# Patient Record
Sex: FEMALE | Race: BLACK OR AFRICAN AMERICAN | Employment: UNEMPLOYED | URBAN - METROPOLITAN AREA
[De-identification: names, ages, dates, MRNs, and addresses within clinical notes are randomized per-mention and may not be internally consistent; named-entity substitution may affect disease eponyms.]

---

## 2017-08-08 ENCOUNTER — OFFICE VISIT - HEALTHEAST (OUTPATIENT)
Dept: FAMILY MEDICINE | Facility: CLINIC | Age: 52
End: 2017-08-08

## 2017-08-08 DIAGNOSIS — H61.20 CERUMEN IMPACTION: ICD-10-CM

## 2017-08-08 DIAGNOSIS — R39.15 URINARY URGENCY: ICD-10-CM

## 2017-08-08 DIAGNOSIS — Z00.00 HEALTH CARE MAINTENANCE: ICD-10-CM

## 2017-08-08 LAB
CHOLEST SERPL-MCNC: 208 MG/DL
FASTING STATUS PATIENT QL REPORTED: YES
HDLC SERPL-MCNC: 59 MG/DL
LDLC SERPL CALC-MCNC: 128 MG/DL
TRIGL SERPL-MCNC: 105 MG/DL

## 2017-08-08 ASSESSMENT — MIFFLIN-ST. JEOR: SCORE: 1322.08

## 2017-08-11 ENCOUNTER — AMBULATORY - HEALTHEAST (OUTPATIENT)
Dept: FAMILY MEDICINE | Facility: CLINIC | Age: 52
End: 2017-08-11

## 2017-08-11 ENCOUNTER — COMMUNICATION - HEALTHEAST (OUTPATIENT)
Dept: FAMILY MEDICINE | Facility: CLINIC | Age: 52
End: 2017-08-11

## 2017-08-11 DIAGNOSIS — N39.0 UTI (URINARY TRACT INFECTION): ICD-10-CM

## 2017-08-14 ENCOUNTER — HOSPITAL ENCOUNTER (OUTPATIENT)
Dept: MAMMOGRAPHY | Facility: CLINIC | Age: 52
Discharge: HOME | End: 2017-08-14

## 2017-08-14 DIAGNOSIS — Z00.00 HEALTH CARE MAINTENANCE: ICD-10-CM

## 2017-08-15 ENCOUNTER — COMMUNICATION - HEALTHEAST (OUTPATIENT)
Dept: MAMMOGRAPHY | Facility: CLINIC | Age: 52
End: 2017-08-15

## 2017-08-16 ENCOUNTER — HOSPITAL ENCOUNTER (OUTPATIENT)
Dept: MAMMOGRAPHY | Facility: CLINIC | Age: 52
Discharge: HOME | End: 2017-08-16

## 2017-08-16 ENCOUNTER — HOSPITAL ENCOUNTER (OUTPATIENT)
Dept: ULTRASOUND IMAGING | Facility: CLINIC | Age: 52
Discharge: HOME | End: 2017-08-16

## 2017-08-16 DIAGNOSIS — N64.9 BREAST LESION: ICD-10-CM

## 2017-08-16 DIAGNOSIS — N64.89 BREAST ASYMMETRY: ICD-10-CM

## 2018-03-20 ENCOUNTER — OFFICE VISIT - HEALTHEAST (OUTPATIENT)
Dept: FAMILY MEDICINE | Facility: CLINIC | Age: 53
End: 2018-03-20

## 2018-03-20 DIAGNOSIS — R05.9 COUGH: ICD-10-CM

## 2018-10-09 ENCOUNTER — OFFICE VISIT - HEALTHEAST (OUTPATIENT)
Dept: FAMILY MEDICINE | Facility: CLINIC | Age: 53
End: 2018-10-09

## 2018-10-09 DIAGNOSIS — N39.0 URINARY TRACT INFECTION: ICD-10-CM

## 2018-10-09 DIAGNOSIS — R30.0 DYSURIA: ICD-10-CM

## 2018-10-09 LAB
ALBUMIN UR-MCNC: NEGATIVE MG/DL
APPEARANCE UR: CLEAR
BACTERIA #/AREA URNS HPF: ABNORMAL HPF
BILIRUB UR QL STRIP: NEGATIVE
COLOR UR AUTO: YELLOW
GLUCOSE UR STRIP-MCNC: NEGATIVE MG/DL
HGB UR QL STRIP: ABNORMAL
KETONES UR STRIP-MCNC: NEGATIVE MG/DL
LEUKOCYTE ESTERASE UR QL STRIP: ABNORMAL
NITRATE UR QL: NEGATIVE
PH UR STRIP: 6 [PH] (ref 5–8)
RBC #/AREA URNS AUTO: ABNORMAL HPF
SP GR UR STRIP: <=1.005 (ref 1–1.03)
SQUAMOUS #/AREA URNS AUTO: ABNORMAL LPF
UROBILINOGEN UR STRIP-ACNC: ABNORMAL
WBC #/AREA URNS AUTO: ABNORMAL HPF

## 2018-10-10 ENCOUNTER — COMMUNICATION - HEALTHEAST (OUTPATIENT)
Dept: FAMILY MEDICINE | Facility: CLINIC | Age: 53
End: 2018-10-10

## 2018-10-10 DIAGNOSIS — N39.0 URINARY TRACT INFECTION: ICD-10-CM

## 2018-10-11 ENCOUNTER — COMMUNICATION - HEALTHEAST (OUTPATIENT)
Dept: FAMILY MEDICINE | Facility: CLINIC | Age: 53
End: 2018-10-11

## 2018-10-11 ENCOUNTER — AMBULATORY - HEALTHEAST (OUTPATIENT)
Dept: FAMILY MEDICINE | Facility: CLINIC | Age: 53
End: 2018-10-11

## 2018-10-11 DIAGNOSIS — N39.0 URINARY TRACT INFECTION: ICD-10-CM

## 2018-10-11 LAB — BACTERIA SPEC CULT: ABNORMAL

## 2018-12-12 ENCOUNTER — OFFICE VISIT - HEALTHEAST (OUTPATIENT)
Dept: FAMILY MEDICINE | Facility: CLINIC | Age: 53
End: 2018-12-12

## 2018-12-12 DIAGNOSIS — Z00.00 ROUTINE GENERAL MEDICAL EXAMINATION AT A HEALTH CARE FACILITY: ICD-10-CM

## 2018-12-12 DIAGNOSIS — Z12.31 VISIT FOR SCREENING MAMMOGRAM: ICD-10-CM

## 2018-12-12 DIAGNOSIS — Z12.11 SCREEN FOR COLON CANCER: ICD-10-CM

## 2018-12-12 DIAGNOSIS — N39.0 URINARY TRACT INFECTION WITHOUT HEMATURIA, SITE UNSPECIFIED: ICD-10-CM

## 2018-12-12 LAB
CHOLEST SERPL-MCNC: 186 MG/DL
FASTING STATUS PATIENT QL REPORTED: YES
FASTING STATUS PATIENT QL REPORTED: YES
GLUCOSE BLD-MCNC: 78 MG/DL (ref 70–99)
HDLC SERPL-MCNC: 65 MG/DL
LDLC SERPL CALC-MCNC: 107 MG/DL
TRIGL SERPL-MCNC: 70 MG/DL

## 2018-12-12 ASSESSMENT — MIFFLIN-ST. JEOR: SCORE: 1352.93

## 2018-12-13 ENCOUNTER — COMMUNICATION - HEALTHEAST (OUTPATIENT)
Dept: FAMILY MEDICINE | Facility: CLINIC | Age: 53
End: 2018-12-13

## 2018-12-13 LAB — BACTERIA SPEC CULT: NO GROWTH

## 2019-01-25 ENCOUNTER — COMMUNICATION - HEALTHEAST (OUTPATIENT)
Dept: ADMINISTRATIVE | Facility: CLINIC | Age: 54
End: 2019-01-25

## 2019-04-11 ENCOUNTER — COMMUNICATION - HEALTHEAST (OUTPATIENT)
Dept: ADMINISTRATIVE | Facility: CLINIC | Age: 54
End: 2019-04-11

## 2019-10-28 ENCOUNTER — COMMUNICATION - HEALTHEAST (OUTPATIENT)
Dept: ADMINISTRATIVE | Facility: CLINIC | Age: 54
End: 2019-10-28

## 2019-10-28 DIAGNOSIS — Z12.11 SPECIAL SCREENING FOR MALIGNANT NEOPLASMS, COLON: ICD-10-CM

## 2019-10-31 ENCOUNTER — RECORDS - HEALTHEAST (OUTPATIENT)
Dept: ADMINISTRATIVE | Facility: OTHER | Age: 54
End: 2019-10-31

## 2020-03-10 ENCOUNTER — OFFICE VISIT - HEALTHEAST (OUTPATIENT)
Dept: FAMILY MEDICINE | Facility: CLINIC | Age: 55
End: 2020-03-10

## 2020-03-10 DIAGNOSIS — Z00.00 ROUTINE GENERAL MEDICAL EXAMINATION AT A HEALTH CARE FACILITY: ICD-10-CM

## 2020-03-10 DIAGNOSIS — R35.0 URINARY FREQUENCY: ICD-10-CM

## 2020-03-10 LAB
ALBUMIN UR-MCNC: ABNORMAL MG/DL
APPEARANCE UR: CLEAR
BACTERIA #/AREA URNS HPF: ABNORMAL HPF
BILIRUB UR QL STRIP: NEGATIVE
CHOLEST SERPL-MCNC: 197 MG/DL
COLOR UR AUTO: YELLOW
FASTING STATUS PATIENT QL REPORTED: YES
FASTING STATUS PATIENT QL REPORTED: YES
GLUCOSE BLD-MCNC: 90 MG/DL (ref 70–99)
GLUCOSE UR STRIP-MCNC: NEGATIVE MG/DL
HDLC SERPL-MCNC: 71 MG/DL
HGB UR QL STRIP: ABNORMAL
KETONES UR STRIP-MCNC: NEGATIVE MG/DL
LDLC SERPL CALC-MCNC: 107 MG/DL
LEUKOCYTE ESTERASE UR QL STRIP: ABNORMAL
NITRATE UR QL: NEGATIVE
PH UR STRIP: 6 [PH] (ref 5–8)
RBC #/AREA URNS AUTO: ABNORMAL HPF
SP GR UR STRIP: 1.02 (ref 1–1.03)
SQUAMOUS #/AREA URNS AUTO: ABNORMAL LPF
TRIGL SERPL-MCNC: 93 MG/DL
UROBILINOGEN UR STRIP-ACNC: ABNORMAL
WBC #/AREA URNS AUTO: ABNORMAL HPF

## 2020-03-10 ASSESSMENT — MIFFLIN-ST. JEOR: SCORE: 1367.09

## 2020-03-11 LAB
BACTERIA SPEC CULT: NO GROWTH
HPV SOURCE: NORMAL
HUMAN PAPILLOMA VIRUS 16 DNA: NEGATIVE
HUMAN PAPILLOMA VIRUS 18 DNA: NEGATIVE
HUMAN PAPILLOMA VIRUS FINAL DIAGNOSIS: NORMAL
HUMAN PAPILLOMA VIRUS OTHER HR: NEGATIVE
SPECIMEN DESCRIPTION: NORMAL

## 2020-03-16 ENCOUNTER — HOSPITAL ENCOUNTER (OUTPATIENT)
Dept: MAMMOGRAPHY | Facility: CLINIC | Age: 55
Discharge: HOME | End: 2020-03-16
Attending: FAMILY MEDICINE

## 2020-03-16 DIAGNOSIS — Z00.00 ROUTINE GENERAL MEDICAL EXAMINATION AT A HEALTH CARE FACILITY: ICD-10-CM

## 2020-03-18 LAB
BKR LAB AP ABNORMAL BLEEDING: NO
BKR LAB AP BIRTH CONTROL/HORMONES: NORMAL
BKR LAB AP CERVICAL APPEARANCE: NORMAL
BKR LAB AP GYN ADEQUACY: NORMAL
BKR LAB AP GYN INTERPRETATION: NORMAL
BKR LAB AP HPV REFLEX: NORMAL
BKR LAB AP LMP: NORMAL
BKR LAB AP PATIENT STATUS: NORMAL
BKR LAB AP PREVIOUS ABNORMAL: NORMAL
BKR LAB AP PREVIOUS NORMAL: 2014
HIGH RISK?: NO
PATH REPORT.COMMENTS IMP SPEC: NORMAL
RESULT FLAG (HE HISTORICAL CONVERSION): NORMAL

## 2020-03-19 ENCOUNTER — COMMUNICATION - HEALTHEAST (OUTPATIENT)
Dept: FAMILY MEDICINE | Facility: CLINIC | Age: 55
End: 2020-03-19

## 2020-03-30 ENCOUNTER — RECORDS - HEALTHEAST (OUTPATIENT)
Dept: ADMINISTRATIVE | Facility: OTHER | Age: 55
End: 2020-03-30

## 2020-03-30 LAB — COLOGUARD-ABSTRACT: NEGATIVE

## 2020-04-08 ENCOUNTER — RECORDS - HEALTHEAST (OUTPATIENT)
Dept: HEALTH INFORMATION MANAGEMENT | Facility: CLINIC | Age: 55
End: 2020-04-08

## 2021-05-31 ENCOUNTER — RECORDS - HEALTHEAST (OUTPATIENT)
Dept: ADMINISTRATIVE | Facility: CLINIC | Age: 56
End: 2021-05-31

## 2021-05-31 VITALS — BODY MASS INDEX: 33.08 KG/M2 | WEIGHT: 175.2 LBS | HEIGHT: 61 IN

## 2021-06-01 VITALS — WEIGHT: 181 LBS | BODY MASS INDEX: 34.2 KG/M2

## 2021-06-02 VITALS — HEIGHT: 61 IN | WEIGHT: 182 LBS | BODY MASS INDEX: 34.36 KG/M2

## 2021-06-02 NOTE — TELEPHONE ENCOUNTER
Dr. Doss    A screening colonoscopy was ordered for Mercedes in December 2018 but was never scheduled.    Is she a candidate for cologuard?    Ashly

## 2021-06-04 VITALS
WEIGHT: 183.38 LBS | OXYGEN SATURATION: 98 % | HEART RATE: 75 BPM | SYSTOLIC BLOOD PRESSURE: 124 MMHG | BODY MASS INDEX: 33.75 KG/M2 | HEIGHT: 62 IN | DIASTOLIC BLOOD PRESSURE: 74 MMHG

## 2021-06-06 NOTE — PROGRESS NOTES
FEMALE PREVENTATIVE EXAM    Assessment and Plan:     Routine general medical examination at a health care facility  -     Lipid Cascade  -     Glucose  -     Gynecologic Cytology (PAP Smear)  -     Mammo Screening Bilateral; Future  -     Cologuard  -     Influenza, Seasonal Quad, PF =/> 6months  We discussed healthy lifestyle, nutrition, cardiovascular risk reduction, self care, safety, sunscreen, seatbelt, and timing of cancer screening.  Health maintenance screening and immunizations reviewed with the patient.    Urinary frequency without dysuria or leakage  -     Urinalysis-UC if Indicated    BMI 34.0-34.9,adult  Counseled healthy lifestyle modifications     Next follow up:  Return in about 1 year (around 3/10/2021).    Immunization Review  Adult Imm Review: Due today, orders placed    I discussed the following with the patient:   Adult Healthy Living: Importance of regular exercise  Healthy nutrition  Getting adequate sleep  Stress management  Supplement use    Subjective:   Chief Complaint: Mercedes Mendez is an 54 y.o. female here for a preventative health visit.     HPI:   Polyuria: She has been experiencing some polyuria with incomplete emptying. She says that her symptoms are worse at night. She does not wear a liner. She inquires about having her urine checked for a possible infection.     Health Maintenance: She is not on any daily medications. She is fasting today. She had a negative pap smear in 2014. She is due for a pap smear today. She has not menstruated in over a year. She will occasionally experience hot flashes. She is sexually active. She denies any pain or difficulties with intercourse. She had a benign mammogram in 2017. She is due for a mammogram this year. She is due for a cologuard this year. She is due for an influenza vaccination today.     Review of Systems: Please see above. The rest of the review of systems are negative for all systems.     PSFH:   She was born in [facility]. She  "went back to [place] 3 years ago with her . Her name means \"full queen.\"     Healthy Habits  Are you taking a daily aspirin? Yes  Do you typically exercising at least 40 min, 3-4 times per week?  NO  Do you usually eat at least 4 servings of fruit and vegetables a day, include whole grains and fiber and avoid regularly eating high fat foods? Yes  Have you had an eye exam in the past two years? Yes  Do you see a dentist twice per year? Yes  Do you have any concerns regarding sleep? No    Safety Screen  If you own firearms, are they secured in a locked gun cabinet or with trigger locks? The patient does not own any firearms  No data recorded    Pap History:   No - age 30-65 PAP every 3 years recommended  Cancer Screening       Status Date      MAMMOGRAM Overdue 8/16/2019      Done 8/16/2017 MAMMO DIAGNOSTIC BILATERAL     Patient has more history with this topic...    PAP SMEAR Overdue 9/17/2019      Done 9/17/2014 GYNECOLOGIC CYTOLOGY (PAP SMEAR)        Patient Care Team:  Elian Eckert MD as PCP - General (Family Medicine)  Elian Eckert MD as Assigned PCP    History     Reviewed By Date/Time Sections Reviewed    Genie Jc CMA 3/10/2020 10:57 AM Tobacco        Objective:   Vital Signs:   Visit Vitals  /74   Pulse 75   Ht 5' 1.5\" (1.562 m)   Wt 183 lb 6 oz (83.2 kg)   SpO2 98%   BMI 34.09 kg/m       PHYSICAL EXAM  Constitutional: Patient is oriented to person, place, and time. Patient appears well-developed and well-nourished. No distress.   Head: Normocephalic and atraumatic.   Right Ear: External ear normal.   Left Ear: External ear normal.   Nose: Nose normal.   Mouth/Throat: Oropharynx is clear and moist. No oropharyngeal exudate.   Eyes: Conjunctivae and EOM are normal. Pupils are equal, round, and reactive to light. Right eye exhibits no discharge. Left eye exhibits no discharge. No scleral icterus.   Neck: Neck supple. No JVD present. No tracheal deviation " present. No thyromegaly present.   Breasts:  Normal appearing, no skin involvement, no palpable mass, no tenderness on palpation.  No axillary involvement  Cardiovascular: Normal rate, regular rhythm, normal heart sounds and intact distal pulses.   No murmur heard.   Pulmonary/Chest: Effort normal and breath sounds normal. No stridor. No respiratory distress. Patient has no wheezes, no rales, exhibits no tenderness.   Abdominal: Soft. Bowel sounds are normal. Patient exhibits no distension and no mass. There is no tenderness. There is no rebound and no guarding.   Lymphadenopathy:  Patient has no cervical adenopathy.   Neurological: Patient is alert and oriented to person, place, and time. Patient has normal reflexes. No cranial nerve deficit. Coordination normal.   Skin: Skin is warm and dry. No rash noted. Patient is not diaphoretic. No erythema. No pallor.   Pelvic:  Normal external genitalia with Normal vulva.  Normal vagina with no polyps or lesions and with physiologic discharge.  Normal cervix with normal mucosa and without CMT.  No adnexal masses  Psychiatric: Patient has good eye contact without any psychomotor retardation or stereotypic behaviors.  normal mood and affect. Judgment and thought content normal.   Speech is regular rate and rhythm.     The ASCVD Risk score ([place] DC Jr., et al., 2013) failed to calculate for the following reasons:    Unable to determine if patient is Non-     ADDITIONAL HISTORY SUMMARIZED (2): None.  DECISION TO OBTAIN EXTRA INFORMATION (1): None.   RADIOLOGY TESTS (1): Reviewed mammogram from 08/16/17 which was benign. Ordered mammogram today.   LABS (1): Reviewed labs from 12/12/18 and ordered labs today.   MEDICINE TESTS (1): Performed pap smear today.   INDEPENDENT REVIEW (2 each): None.     The visit lasted a total of 11 minutes face to face with the patient. Over 50% of the time was spent counseling and educating the patient about overall  wellness.    I, Renetta Mccann, am scribing for and in the presence of, Dr. Doss.    I, Dr. Doss, personally performed the services described in this documentation, as scribed by Renetta Mccann in my presence, and it is both accurate and complete.    Total data points: 3       Medication List      as of March 10, 2020 12:03 PM     You have not been prescribed any medications.         Additional Screenings Completed Today:

## 2021-06-12 NOTE — PROGRESS NOTES
"Mercedes Mendez is a 51 y.o. female who is here for a health maintenance visit.  She denies any past medical or surgical history.   She is not taking any medications.     She does have an acute concerns today with urinary \"comes on quick\" endorses urinary urgency.   Denies painful urination, blood in urine. This has been going on for years.     She is  with 3 children. Pap smear is up to date.   She states her colonoscopy was completed and due every 5 years, states she is due today.     Reviewed past medical/surgical history, family history, social history, medications and updated chart below.     No Known Allergies  No current outpatient prescriptions on file.     No current facility-administered medications for this visit.      History reviewed. No pertinent past medical history.  History reviewed. No pertinent surgical history.  Social History     Social History     Marital status:      Spouse name: N/A     Number of children: 3     Years of education: N/A     Occupational History           Social History Main Topics     Smoking status: Never Smoker     Smokeless tobacco: Never Used     Alcohol use No     Drug use: No     Sexual activity: Yes     Partners: Male     Birth control/ protection: None     Other Topics Concern     None     Social History Narrative     None     Family History   Problem Relation Age of Onset     Hypertension Mother          Review of Systems   Constitutional: Negative.   HENT: Cerumen bilateral ears  Eyes: Negative.   Respiratory: Negative.   Cardiovascular: Negative.   Gastrointestinal: Negative.   Endocrine: Negative.   Genitourinary: Urinary ugency  Musculoskeletal: Negative.   Skin: Negative.   Allergic/Immunologic: Negative.   Neurological: Negative.   Hematological: Negative.   Psychiatric/Behavioral: Negative.       Objective:     Physical Exam   /68  Ht 5' 1\" (1.549 m)  Wt 175 lb 3.2 oz (79.5 kg)  BMI 33.1 kg/m2    Constitutional: Alert and " oriented x3, well nourished without any acute distress  HENT:   Right Ear: External ear normal. Cerumen impaction bilaterally, improved with removal: irrigation and curette.   Left Ear: External ear normal.   Nose: Nose normal.   Mouth/Throat: Oropharynx is clear and moist.   Eyes: Conjunctivae and EOM are normal. Pupils are equal, round, and reactive to light. Right eye exhibits no discharge. Left eye exhibits no discharge.   Neck: No thyromegaly present.   Lymphadenopathy: Without palpable lymphadenopathy  Cardiovascular: Normal S1 and S2. Regular rate, rhythm and no murmur, rubs or gallops. Palpable distal pulses bilaterally.  Pulmonary/Chest: Normal effort. Lungs clear to auscultation in all lobes. Without wheezing, rhonchi or crackles.    Abdominal: Soft, non tenderness. There is no rebound or guarding. Bowel sounds x4. Without organomegaly. No CVA tenderness.  Musculoskeletal: 5/5 strength  And full ROM in all extremities. No joint swelling or deformity  Neurological: Cranial nerves intact, without deficit. Without numbness, tingling or paresthesia. Normal reflexes  Skin: Dry and intact; without rashes or lesions.   Psychiatric: Normal mood and affect.  Breast: No chest deformity, asymmetry. Normal contours. Without nodules, masses, tenderness, or axillary adenopathy. No nipple discharge or dimpling noted.     1. Health care maintenance  Pap smear completed 2014 and negative HPV due 2019  Colonoscopy and due this year, order placed, patient states every 5 years.   Tdap up dated  Fasting labs completed, ordered and I will follow up with results once received.   We discussed healthy lifestyle, nutrition, cardiovascular risk reduction, self care, safety, self breast exams, sunscreen, and seatbelt screening.  Recommended annual physical exam or sooner for any acute concerns.   Patient agreed and appeared pleased with plan      - Lipid Cascade  - Comprehensive Metabolic Panel  - HM2(CBC w/o Differential)  - Vitamin  D, Total (25-Hydroxy)  - Mammo Screening Bilateral; Future  - Ambulatory referral to Gastroenterology  - Tdap vaccine,  6yo or older,  IM    2. Urinary urgency  I will compete UA/UC to look for underlying cause and will treat if indicated, pending results  Discussed with patient to follow up if worsening symptoms, questions or concerns  Patient agreed and appeared pleased with plan    - Urinalysis-UC if Indicated      3. Cerumen Impaction  Cerumen impaction removed without side effects today via irrigation and curette.  Ear Cerumen Removal  Date/Time: 8/8/2017 2:32 PM  Performed by: KATHARINE KHAN  Authorized by: KATHARINE KHAN   Location details: right ear and left ear  Procedure type: curette  Procedure type comments: curette and irrigation completed today with symptom resolution    Sedation:  Patient sedated: no

## 2021-06-16 NOTE — PROGRESS NOTES
Assessment/Plan:     Presents with symptoms of a dry cough.  We discussed that I suspect this was not a bacterial etiology, it is possibly a viral upper respiratory tract infection versus environmental allergy (more likely).  Recommended symptomatic treatment and had an extensive discussion about the futility of antibiotics.  Patient eventually was comfortable with the plan of leaving without a prescription medication, and return precautions were discussed.    Problem List Items Addressed This Visit     None      Visit Diagnoses     Cough    -  Primary          Return to clinic PRN.    Total time spent with patient was 15 minutes with greater than 50% spent in face-to-face counseling regarding the above plan.    Subjective:      Mercedes Mendez is a 52 y.o. female who presents to clinic for DRY COUGH.    There is a slight language barrier between the patient, her , myself I think we are able to be understood.  Patient has been expensing symptoms for approximately 2 weeks.  She does experience symptoms like this almost once per year.  It did get better within that 2 week timeframe but then over the past 5 days it has been worsening.  The cough is worse at midnight.  It is generally dry but every once in while she can produce phlegm.  There is some pain with swallowing and she notices that her glands are swollen.  There are no known sick contacts and no recent travel.    Denies: fever, gi symptoms, headache, sinus pressure, ear pain, rashes, SOB, chest pain      Past Medical History, Family History, and Social History reviewed.     No current outpatient prescriptions on file prior to visit.     No current facility-administered medications on file prior to visit.        Review of systems is as stated in HPI.  The remainder of the 10 system review is otherwise negative.    Objective:     /64 (Patient Site: Right Arm, Patient Position: Sitting, Cuff Size: Adult Regular)  Pulse 77  Temp 97.6  F (36.4   C) (Oral)   Resp 20  Wt 181 lb (82.1 kg)  SpO2 99%  BMI 34.2 kg/m2 Body mass index is 34.2 kg/(m^2).    Gen: Alert, NAD, appears stated age, normal hygiene   Eyes: conjunctivae without injection, sclera clear, EOMI  ENT/mouth: nares clear, septum midline, absent rhinorrhea, absent pharyngeal injection, neck is supple, no thyroid enlargement  CV: RRR, no murmur appreciated, pedal edema absent bilaterally  Resp: CTAB, no wheezes, rales or ronchi  ABD: non-tender to palpation, nondistended  MSK: grossly full range of motion in all joints, no obvious deformity  Neuro: CN II-XII grossly intact, no deficits in coordination  Psych: no apparent hallucinations or delusions, no pressured speech; alert, oriented x3  SKIN: dry and without lesions  Heme/lymph: no pallor, no active bleeding/bruising, significant bilateral submandibular adenopathy appreciated    This note has been dictated using voice recognition software. Any grammatical or context distortions are unintentional and inherent to the the software.     Janelle Scott MD

## 2021-06-18 NOTE — LETTER
Letter by Ashly Benson at      Author: Ashly Benson Service: -- Author Type: --    Filed:  Encounter Date: 1/25/2019 Status: (Other)       Mercedes Mendez  [address] [facility] [address]           01/25/19       Dear Mercedes:      At [facility], we care about your health and well-being.  Your primary care provider is committed to ensuring you receive high quality care and has chosen a network of specialists to assist in providing that care.  Recently Dr. Doss referred you to [facility] for a screening colonoscopy. They have made attempts to assist you in scheduling this appointment and have been unsuccessful.    It is important to your overall health to follow through with the referral from your care provider.  Please call me at [phone] at your earliest convenience for assistance in scheduling an appointment with the recommended specialist.        Sincerely,        Electronically signed by Ashly Benson      Referral Coordinator   [facility]

## 2021-06-19 NOTE — LETTER
Letter by Ashly Benson at      Author: Ashly Benson Service: -- Author Type: --    Filed:  Encounter Date: 4/11/2019 Status: (Other)         Mercedes Mendez  [address] [id number]           04/11/19       Dear Valery:      At [facility], we care about your health and well-being.  Your primary care provider is committed to ensuring you receive high quality care and has chosen a network of specialists to assist in providing that care.  Recently Dr. Doss referred you to [facility] for a screening mammogram.    It is important to your overall health to follow through with the referral from your care provider.  Please call [facility] scheduling [phone] at your earliest convenience for assistance in scheduling an appointment.  If you have already scheduled an appointment, please disregard this notice.  Thank you for choosing the [facility] for your scheduling needs.        Sincerely,        Electronically signed by Ashly Benson      Referral Coordinator   [facility]

## 2021-06-20 NOTE — PROGRESS NOTES
Assessment:     1. Dysuria  Urinalysis-UC if Indicated    Culture, Urine    phenazopyridine (PYRIDIUM) 200 MG tablet   2. Urinary tract infection  sulfamethoxazole-trimethoprim (SEPTRA DS) 800-160 mg per tablet     Results for orders placed or performed in visit on 10/09/18   Culture, Urine   Result Value Ref Range    Culture >100,000 col/ml Escherichia coli (!)    Urinalysis-UC if Indicated   Result Value Ref Range    Color, UA Yellow Colorless, Yellow, Straw, Light Yellow    Clarity, UA Clear Clear    Glucose, UA Negative Negative    Bilirubin, UA Negative Negative    Ketones, UA Negative Negative    Specific Gravity, UA <=1.005 1.005 - 1.030    Blood, UA Moderate (!) Negative    pH, UA 6.0 5.0 - 8.0    Protein, UA Negative Negative mg/dL    Urobilinogen, UA 0.2 E.U./dL 0.2 E.U./dL, 1.0 E.U./dL    Nitrite, UA Negative Negative    Leukocytes, UA Small (!) Negative    Bacteria, UA Few (!) None Seen hpf    RBC, UA 0-2 None Seen, 0-2 hpf    WBC, UA 5-10 (!) None Seen, 0-5 hpf    Squam Epithel, UA 0-5 None Seen, 0-5 lpf            Plan:     Differential diagnosis include but not limited to urinary frequency, dysuria, or UTI.  Discussed with the patient on urinalysis she does have small amount of leukocytes which could be causing dysuria therefore she does have a UTI.  Will treat with Bactrim.  Advised patient to increase fluid intake.  Monitor for worsening symptoms.  We will send out a culture and consult patient with the results if she needs a different antibiotics.  She may follow-up with a PCP if symptoms does not resolve after treatment.  Patient verbalized understanding the plan of care.    Subjective:       53 y.o. female presents for evaluation of UTI symptoms.  Patient reports burning on urination, suprapubic pain, urgency, frequency, and incontinence.  She has been experiencing symptoms for 1 day.  She has not taken any medications.  No recent treatment for UTI.  Currently sexually active, denies abnormal  discharge.  She denies fever, nausea, vomiting, or diarrhea.    The following portions of the patient's history were reviewed and updated as appropriate: allergies, current medications, past family history, past medical history, past social history, past surgical history and problem list.    Review of Systems  A 12 point comprehensive review of systems was negative except as noted.     Objective:      /84  Pulse 89  Temp 97  F (36.1  C) (Oral)   Resp 18  Wt 181 lb (82.1 kg)  SpO2 100%  BMI 34.2 kg/m2  General appearance: alert, appears stated age, cooperative and moderate distress  Head: Normocephalic, without obvious abnormality, atraumatic  Lungs: clear to auscultation bilaterally  Heart: regular rate and rhythm, S1, S2 normal, no murmur, click, rub or gallop  Abdomen: abnormal findings:  moderate tenderness suprapubic tenderness.  Negative for flank pain or tenderness with palpation  Extremities: extremities normal, atraumatic, no cyanosis or edema  Neurologic: Grossly normal     This note has been dictated using voice recognition software. Any grammatical or context distortions are unintentional and inherent to the software

## 2021-06-20 NOTE — LETTER
Letter by Janelle Bautista MD at      Author: Janelle Bautista MD Service: -- Author Type: --    Filed:  Encounter Date: 3/19/2020 Status: (Other)         Mercedes Mendez  [address] [facility] [place] [address]             March 19, 2020         Dear Ms. Mendez,    Below are the results from your recent visit:    Resulted Orders   Lipid Cascade   Result Value Ref Range    Cholesterol 197 <=199 mg/dL    Triglycerides 93 <=149 mg/dL    HDL Cholesterol 71 >=50 mg/dL    LDL Calculated 107 <=129 mg/dL    Patient Fasting > 8hrs? Yes    Glucose   Result Value Ref Range    Glucose 90 70 - 99 mg/dL    Patient Fasting > 8hrs? Yes     Narrative    Fasting Glucose reference range is 70-99 mg/dL per  American Diabetes Association (ADA) guidelines.   Gynecologic Cytology (PAP Smear)   Result Value Ref Range    Case Report       Gynecologic Cytology Report                       Case: [id number]                                   Authorizing Provider:  Elian Eckert       Collected:           03/10/2020 Fabricio Cheema MD                                                                  Ordering Location:     [facility]   Received:            03/10/2020 1132                                     Family Medicine/OB                                                           First Screen:          Clair Hernandez, CT                                                                           (ASCP)                                                                       Specimen:    SUREPATH PAP, SCREENING, Endocervical/cervical                                             Interpretation  Negative for squamous intraepithelial lesion or malignancy.      Negative for squamous intraepithelial lesion or malignancy    Result Flag Normal Normal    Specimen Adequacy       Satisfactory for evaluation, endocervical/transformation zone component present    HPV Reflex? Yes regardless  of result     HIGH RISK No     LMP/Menopause Date postmenopausal     Abnormal Bleeding No     Pt Status na     Birth Control/Hormones None     Previous Normal/Date 2014     Prev Abn Date/Dx .     Cervical Appearance normal    Urinalysis-UC if Indicated   Result Value Ref Range    Color, UA Yellow Colorless, Yellow, Straw, Light Yellow    Clarity, UA Clear Clear    Glucose, UA Negative Negative    Bilirubin, UA Negative Negative    Ketones, UA Negative Negative    Specific Gravity, UA 1.025 1.005 - 1.030    Blood, UA Trace (!) Negative    pH, UA 6.0 5.0 - 8.0    Protein, UA Trace (!) Negative mg/dL    Urobilinogen, UA 0.2 E.U./dL 0.2 E.U./dL, 1.0 E.U./dL    Nitrite, UA Negative Negative    Leukocytes, UA Small (!) Negative    Bacteria, UA None Seen None Seen hpf    RBC, UA 0-2 None Seen, 0-2 hpf    WBC, UA 0-5 None Seen, 0-5 hpf    Squam Epithel, UA 0-5 None Seen, 0-5 lpf    Narrative    Urine Culture ordered based on [facility] criteria   Culture, Urine   Result Value Ref Range    Culture No Growth    HPV High Risk DNA Cervical   Result Value Ref Range    HPV Source SurePath     HPV16 DNA Negative NEG    HPV18 DNA Negative NEG    Other HR HPV Negative NEG    Final Diagnosis SEE NOTES       Comment:      This patient's sample is negative for HPV DNA.  This test was developed and its performance characteristics determined by the  [facility]. It  has not been cleared or approved by the FDA. The laboratory is regulated under  CLIA as qualified to perform high-complexity testing. This test is used for  clinical purposes. It should not be regarded as investigational or for  research.  (Note)  METHODOLOGY:  The Roche mike 4800 system uses automated extraction,  simultaneous amplification of HPV (L1 region) and beta-globin,  followed by  real time detection of fluorescent labeled HPV and beta  globin using specific oligonucleotide probes . The  "test specifically  identifies types HPV 16 DNA and HPV 18 DNA while concurrently  detecting the rest of the high risk types (31, 33, 35, 39, 45, 51,  52, 56, 58, 59, 66 or 68).    COMMENTS:  This test is not intended for use as a screening device  for women under age 30 with normal cervical   cytology.  Results should  be correlated with cytologic and histologic findings. Close clinical  followup is recommended.        Specimen Description Cervical Cells       Comment:        Performed and/or entered by:  [address] [address]         Your recent pap smear is normal, including negative for presence of any high risk viruses which are responsible  for cervical cancer (this is the HPV \"cotesting\"). We recommend that this test should be repeated in 5 years. If you  have any questions please call our clinic.     Please call with questions or contact us using [employer].    Sincerely,        Electronically signed by Janelle Bautista MD       "

## 2021-06-22 NOTE — PROGRESS NOTES
FEMALE PREVENTATIVE EXAM    Assessment and Plan:     Routine general medical examination at a health care facility  We discussed healthy lifestyle, nutrition, cardiovascular risk reduction, self care, safety, sunscreen, seatbelt, and timing of cancer screening.  Health maintenance screening and immunizations reviewed with the patient.  -     Lipid Cascade  -     Glucose    Visit for screening mammogram  -     Mammo Screening Bilateral; Future    Screen for colon cancer  -     Ambulatory referral for Colonoscopy    Urinary tract infection, completed antibiotics  Treated 2 months but with freq & urgency  -     Culture, Urine    Other orders  -     Influenza, Seasonal,Quad Inj, 36+ MOS    Obesity  Counseled healthy lifestyle modifications    Rosacea, cheeks  Not bothersome to pt at this time    Next follow up:  Return in about 1 year (around 12/12/2019) for Annual physical.    Immunization Review  Adult Imm Review: No immunizations due today  Pt will recieve flu shot today    I discussed the following with the patient:   Adult Healthy Living: Importance of regular exercise  Healthy nutrition  Weight loss referral options  Getting adequate sleep  Stress management    Subjective:   Chief Complaint: Mercedes Mendez is an 53 y.o. female here for a preventative health visit.     HPI: Patient eats a very healthy diet, all organic, no fast food, very little meat. Pt had a UTI 2 months ago and is still experiencing intermittent frequent urination.     Healthy Habits  Are you taking a daily aspirin? No  Do you typically exercising at least 40 min, 3-4 times per week?  NO  Do you usually eat at least 4 servings of fruit and vegetables a day, include whole grains and fiber and avoid regularly eating high fat foods? Yes  Have you had an eye exam in the past two years? NO  Do you see a dentist twice per year? NO  Do you have any concerns regarding sleep? No    Safety Screen  If you own firearms, are they secured in a locked gun  "cabinet or with trigger locks? The patient does not own any firearms  Do you feel you are safe where you are living?: Yes (10/9/2018  5:50 PM)  Do you feel you are safe in your relationship(s)?: Yes (10/9/2018  5:50 PM)    Review of Systems:  Please see above.  The rest of the review of systems are negative for all systems.     History:  Cancer Screening       Status Date      COLONOSCOPY Overdue 9/3/2014     MAMMOGRAM Overdue 8/16/2018      Done 8/16/2017 MAMMO DIAGNOSTIC BILATERAL     Patient has more history with this topic...    PAP SMEAR Next Due 9/17/2019      Done 9/17/2014 GYNECOLOGIC CYTOLOGY (PAP SMEAR)        Patient Care Team:  Elian Eckert MD as PCP - General (Family Medicine)    History     Reviewed By Date/Time Sections Reviewed    Genie Jc CMA 12/12/2018 11:34 AM Tobacco        Objective:   Vital Signs:   Visit Vitals  /80   Pulse 64   Ht 5' 1\" (1.549 m)   Wt 182 lb (82.6 kg)   BMI 34.39 kg/m         PHYSICAL EXAM    Objective:    Physical Exam   Vitals:    12/12/18 1134   BP: 118/80   Pulse: 64      Constitutional: Patient is oriented to person, place, and time. Patient appears well-developed and well-nourished. No distress.   Head: Normocephalic and atraumatic.   Right Ear: External ear normal. Normal TM  Left Ear: External ear normal. Normal TM  Nose: Nose normal.   Mouth/Throat: Oropharynx is clear and moist. No oropharyngeal exudate.   Eyes: Conjunctivae and EOM are normal. Pupils are equal, round, and reactive to light. Right eye exhibits no discharge. Left eye exhibits no discharge. No scleral icterus.   Neck: Neck supple. No JVD present. No tracheal deviation present. No thyromegaly present.   Cardiovascular: Normal rate, regular rhythm, normal heart sounds and intact distal pulses. No murmur heard.   Pulmonary/Chest: Effort normal and breath sounds normal. No stridor. No respiratory distress. Patient has no wheezes, no rales, exhibits no tenderness. "   Abdominal: Soft. Bowel sounds are normal. Patient exhibits no distension and no mass. There is no tenderness. There is no rebound and no guarding.   Lymphadenopathy:  Patient has no cervical adenopathy.   Neurological: Patient is alert and oriented to person, place, and time. Patient has normal reflexes. No cranial nerve deficit. Coordination normal.   Skin: Skin is warm and dry. Patient is not diaphoretic. No erythema. No pallor. Rosacea on both cheeks.   Normal breast exam       Medication List      as of 12/12/2018 12:27 PM     You have not been prescribed any medications.       Additional Screenings Completed Today:     ADDITIONAL HISTORY SUMMARIZED (2): None.   DECISION TO OBTAIN EXTRA INFORMATION (1): None.   RADIOLOGY TESTS (1): None.  LABS (1): Labs ordered today.  MEDICINE TESTS (1): None.  INDEPENDENT REVIEW (2 each): Urinalysis reviewed from 10/09/2018.     Total data points = 3    Total time was 20 minutes, greater than 50% counseling and coordinating care regarding the above issues.    By signing my name below, I, Daria Cervantes, attest that this documentation has been prepared under the direction and in the presence of Dr. Anette Doss.  Electronic Signature: Jimmy Fernandez. 12/12/2018 11:54.    I, Dr. Elian Casiano MD , personally performed the services described in this documentation. All medical record entries made by the scribe were at my direction and in my presence. I have reviewed the chart and discharge instructions (if applicable) and agree that the record reflects my personal performance and is accurate and complete.

## 2022-01-27 ENCOUNTER — OFFICE VISIT (OUTPATIENT)
Dept: FAMILY MEDICINE | Facility: CLINIC | Age: 57
End: 2022-01-27
Payer: COMMERCIAL

## 2022-01-27 VITALS
HEART RATE: 76 BPM | DIASTOLIC BLOOD PRESSURE: 90 MMHG | BODY MASS INDEX: 34.25 KG/M2 | HEIGHT: 61 IN | WEIGHT: 181.4 LBS | SYSTOLIC BLOOD PRESSURE: 120 MMHG

## 2022-01-27 DIAGNOSIS — Z00.00 ROUTINE ADULT HEALTH MAINTENANCE: Primary | ICD-10-CM

## 2022-01-27 LAB
CHOLEST SERPL-MCNC: 192 MG/DL
FASTING STATUS PATIENT QL REPORTED: NORMAL
FASTING STATUS PATIENT QL REPORTED: NORMAL
GLUCOSE BLD-MCNC: 83 MG/DL (ref 70–125)
HDLC SERPL-MCNC: 64 MG/DL
LDLC SERPL CALC-MCNC: 112 MG/DL
TRIGL SERPL-MCNC: 80 MG/DL

## 2022-01-27 PROCEDURE — 90471 IMMUNIZATION ADMIN: CPT | Performed by: FAMILY MEDICINE

## 2022-01-27 PROCEDURE — 87086 URINE CULTURE/COLONY COUNT: CPT | Performed by: FAMILY MEDICINE

## 2022-01-27 PROCEDURE — 99396 PREV VISIT EST AGE 40-64: CPT | Mod: 25 | Performed by: FAMILY MEDICINE

## 2022-01-27 PROCEDURE — 36415 COLL VENOUS BLD VENIPUNCTURE: CPT | Performed by: FAMILY MEDICINE

## 2022-01-27 PROCEDURE — 90682 RIV4 VACC RECOMBINANT DNA IM: CPT | Performed by: FAMILY MEDICINE

## 2022-01-27 PROCEDURE — 82947 ASSAY GLUCOSE BLOOD QUANT: CPT | Performed by: FAMILY MEDICINE

## 2022-01-27 PROCEDURE — 80061 LIPID PANEL: CPT | Performed by: FAMILY MEDICINE

## 2022-01-27 ASSESSMENT — MIFFLIN-ST. JEOR: SCORE: 1351.82

## 2022-01-27 NOTE — PROGRESS NOTES
SUBJECTIVE:   CC: Mercedes Mendez is an 56 year old woman who presents for preventive health visit.       Patient has been advised of split billing requirements and indicates understanding: Yes  Healthy Habits:     Getting at least 3 servings of Calcium per day:  Yes    Bi-annual eye exam:  Yes    Dental care twice a year:  NO    Sleep apnea or symptoms of sleep apnea:  None    Diet:  Regular (no restrictions)    Frequency of exercise:  1 day/week    Duration of exercise:  N/A    Taking medications regularly:  Yes    Barriers to taking medications:  None    Medication side effects:  None    PHQ-2 Total Score: 0    Additional concerns today:  No    Today's PHQ-2 Score:   PHQ-2 ( 1999 [employer]) 1/26/2022   Q1: Little interest or pleasure in doing things 0   Q2: Feeling down, depressed or hopeless 0   PHQ-2 Score 0   Q1: Little interest or pleasure in doing things Not at all   Q2: Feeling down, depressed or hopeless Not at all   PHQ-2 Score 0     Abuse: Current or Past (Physical, Sexual or Emotional) - No  Do you feel safe in your environment? Yes    Social History     Tobacco Use     Smoking status: Never Smoker     Smokeless tobacco: Never Used   Substance Use Topics     Alcohol use: No     If you drink alcohol do you typically have >3 drinks per day or >7 drinks per week? No    Reviewed orders with patient.  Reviewed health maintenance and updated orders accordingly - Yes    Breast Cancer Screening:    Breast CA Risk Assessment (FHS-7) 1/26/2022   Do you have a family history of breast, colon, or ovarian cancer? No / Unknown       click delete button to remove this line now  Mammogram Screening: Recommended mammography every 1-2 years with patient discussion and risk factor consideration  Pertinent mammograms are reviewed under the imaging tab.    History of abnormal Pap smear: NO - age 30-65 PAP every 5 years with negative HPV co-testing recommended  PAP / HPV Latest Ref Rng & Units 3/10/2020 9/17/2014   PAP  "Negative for squamous intraepithelial lesion or malignancy. Negative for squamous intraepithelial lesion or malignancy  Electronically signed by Clair Hernandez CT (ASCP) on 3/18/2020 at  1:02 PM   Negative for squamous intraepithelial lesion or malignancy  Electronically signed by Gretchen Campos CT (ASCP) on 9/26/2014 at 12:29 PM     HPV16 NEG Negative -   HPV18 NEG Negative -   HRHPV NEG Negative -     Reviewed and updated as needed this visit by clinical staff  Tobacco  Allergies  Meds  Problems            Reviewed and updated as needed this visit by Provider    Meds  Problems           No past medical history on file.   No past surgical history on file.    Review of Systems  CONSTITUTIONAL: NEGATIVE for fever, chills, change in weight  INTEGUMENTARY/SKIN: NEGATIVE for worrisome rashes, moles or lesions  EYES: NEGATIVE for vision changes or irritation  ENT: NEGATIVE for ear, mouth and throat problems  RESP: NEGATIVE for significant cough or SOB  BREAST: NEGATIVE for masses, tenderness or discharge  CV: NEGATIVE for chest pain, palpitations or peripheral edema  GI: NEGATIVE for nausea, abdominal pain, heartburn, or change in bowel habits  : NEGATIVE for unusual urinary or vaginal symptoms. No vaginal bleeding.  MUSCULOSKELETAL: NEGATIVE for significant arthralgias or myalgia  NEURO: NEGATIVE for weakness, dizziness or paresthesias  PSYCHIATRIC: NEGATIVE for changes in mood or affect      OBJECTIVE:   BP (!) 120/90 (BP Location: Left arm, Patient Position: Sitting, Cuff Size: Adult Large)   Pulse 76   Ht 1.56 m (5' 1.42\")   Wt 82.3 kg (181 lb 6.4 oz)   Breastfeeding No   BMI 33.81 kg/m    Physical Exam  GENERAL APPEARANCE: healthy, alert and no distress  EYES: Eyes grossly normal to inspection, PERRL and conjunctivae and sclerae normal  HENT: ear canals and TM's normal  NECK: no adenopathy, no asymmetry, masses, or scars and thyroid normal to palpation  RESP: lungs clear to auscultation - no " "rales, rhonchi or wheezes  BREAST: normal without masses, tenderness or nipple discharge and no palpable axillary masses or adenopathy  CV: regular rate and rhythm, normal S1 S2, no S3 or S4, no murmur, click or rub, no peripheral edema and peripheral pulses strong  ABDOMEN: soft, nontender, no hepatosplenomegaly, no masses and bowel sounds normal  MS: no musculoskeletal defects are noted and gait is age appropriate without ataxia  SKIN: no suspicious lesions or rashes  NEURO: Normal strength and tone, sensory exam grossly normal, mentation intact and speech normal  PSYCH: mentation appears normal and affect normal/bright    Diagnostic Test Results:  Labs reviewed in Epic    ASSESSMENT/PLAN:   Mercedes was seen today for physical.    Diagnoses and all orders for this visit:    Routine adult health maintenance  -     Lipid Profile (Chol, Trig, HDL, LDL calc); Future  -     Glucose; Future  -     *MA Screening Digital Bilateral; Future  -     Urine Culture Aerobic Bacterial - lab collect; Future  -     Lipid Profile (Chol, Trig, HDL, LDL calc)  -     Glucose    BMI 33.0-33.9,adult    Other orders  -     VA RIV4 (FLUBLOK) VACCINE RECOMBINANT DNA PRSRV ANTIBIO FREE, IM        Patient has been advised of split billing requirements and indicates understanding: Yes    COUNSELING:  Reviewed preventive health counseling, as reflected in patient instructions    Estimated body mass index is 33.81 kg/m  as calculated from the following:    Height as of this encounter: 1.56 m (5' 1.42\").    Weight as of this encounter: 82.3 kg (181 lb 6.4 oz).    Weight management plan: Discussed healthy diet and exercise guidelines    She reports that she has never smoked. She has never used smokeless tobacco.      Counseling Resources:  ATP IV Guidelines  Pooled Cohorts Equation Calculator  Breast Cancer Risk Calculator  BRCA-Related Cancer Risk Assessment: FHS-7 Tool  FRAX Risk Assessment  ICSI Preventive Guidelines  Dietary Guidelines for " Americans, 2010  USDA's MyPlate  ASA Prophylaxis  Lung CA Screening    Elian Casiano MD  [facility]

## 2022-01-28 LAB — BACTERIA UR CULT: NORMAL

## 2022-05-29 ENCOUNTER — HEALTH MAINTENANCE LETTER (OUTPATIENT)
Age: 57
End: 2022-05-29

## 2022-06-03 ENCOUNTER — OFFICE VISIT (OUTPATIENT)
Dept: FAMILY MEDICINE | Facility: CLINIC | Age: 57
End: 2022-06-03
Payer: COMMERCIAL

## 2022-06-03 VITALS
BODY MASS INDEX: 34.3 KG/M2 | HEART RATE: 76 BPM | WEIGHT: 184 LBS | DIASTOLIC BLOOD PRESSURE: 91 MMHG | OXYGEN SATURATION: 97 % | SYSTOLIC BLOOD PRESSURE: 168 MMHG | RESPIRATION RATE: 16 BRPM | TEMPERATURE: 98 F

## 2022-06-03 DIAGNOSIS — R39.89 URINARY PROBLEM: Primary | ICD-10-CM

## 2022-06-03 DIAGNOSIS — N30.00 ACUTE CYSTITIS WITHOUT HEMATURIA: ICD-10-CM

## 2022-06-03 LAB
ALBUMIN UR-MCNC: NEGATIVE MG/DL
APPEARANCE UR: CLEAR
BACTERIA #/AREA URNS HPF: ABNORMAL /HPF
BILIRUB UR QL STRIP: NEGATIVE
COLOR UR AUTO: YELLOW
GLUCOSE UR STRIP-MCNC: NEGATIVE MG/DL
HGB UR QL STRIP: ABNORMAL
KETONES UR STRIP-MCNC: NEGATIVE MG/DL
LEUKOCYTE ESTERASE UR QL STRIP: ABNORMAL
NITRATE UR QL: NEGATIVE
PH UR STRIP: 5.5 [PH] (ref 5–8)
RBC #/AREA URNS AUTO: ABNORMAL /HPF
SP GR UR STRIP: <=1.005 (ref 1–1.03)
SQUAMOUS #/AREA URNS AUTO: ABNORMAL /LPF
UROBILINOGEN UR STRIP-ACNC: 0.2 E.U./DL
WBC #/AREA URNS AUTO: ABNORMAL /HPF

## 2022-06-03 PROCEDURE — 81001 URINALYSIS AUTO W/SCOPE: CPT | Performed by: EMERGENCY MEDICINE

## 2022-06-03 PROCEDURE — 99213 OFFICE O/P EST LOW 20 MIN: CPT | Performed by: EMERGENCY MEDICINE

## 2022-06-03 RX ORDER — NITROFURANTOIN 25; 75 MG/1; MG/1
100 CAPSULE ORAL 2 TIMES DAILY
Qty: 10 CAPSULE | Refills: 0 | Status: SHIPPED | OUTPATIENT
Start: 2022-06-03 | End: 2022-06-03

## 2022-06-03 RX ORDER — NITROFURANTOIN 25; 75 MG/1; MG/1
100 CAPSULE ORAL 2 TIMES DAILY
Qty: 10 CAPSULE | Refills: 0 | Status: SHIPPED | OUTPATIENT
Start: 2022-06-03 | End: 2022-06-08

## 2022-06-03 NOTE — PROGRESS NOTES
Impression:  Urinary symptoms with positive leuk esterase and blood in the urine dipstick but negative white cells or red cells or bacteria on the micro.  Possible early acute cystitis.  Will need to await urine culture for confirmation    Plan:  Begin Macrobid pending results of urine culture.  Drink plenty of fluids, Tylenol for pain, follow-up or seek care if new or worsening symptoms      Chief Complaint:  Patient presents with:  Urinary Problem: Back pain frequency started yesterday morning.         HPI:   Mercedes Mendez is a 56 year old female who presents to this clinic for the evaluation of urinary symptoms.  Patient complains of a 1 day history of urinary frequency and urgency.  She has dysuria.  She has some vague lower back pain for the past 1 day.  She has felt warm and chilly but has not measured her temperature.  No nausea or vomiting.  No abdominal pain.      PMH:   No past medical history on file.  No past surgical history on file.      ROS:  All other systems negative    Meds:  No current outpatient medications on file.        Social:  Social History     Socioeconomic History     Marital status:      Spouse name: Not on file     Number of children: 3     Years of education: Not on file     Highest education level: Not on file   Occupational History     Not on file   Tobacco Use     Smoking status: Never Smoker     Smokeless tobacco: Never Used   Substance and Sexual Activity     Alcohol use: No     Drug use: No     Sexual activity: Yes     Partners: Male     Birth control/protection: None   Other Topics Concern     Not on file   Social History Narrative     Not on file     Social Determinants of Health     Financial Resource Strain: Not on file   Food Insecurity: Not on file   Transportation Needs: Not on file   Physical Activity: Not on file   Stress: Not on file   Social Connections: Not on file   Intimate Partner Violence: Not on file   Housing Stability: Not on file         Physical  Exam:  Vitals:    06/03/22 1719   BP: (!) 168/91   Pulse: 76   Resp: 16   Temp: 98  F (36.7  C)   TempSrc: Oral   SpO2: 97%   Weight: 83.5 kg (184 lb)      Patient is awake, alert, no distress  Eyes: PERRL, EOMI  There is no CVA tenderness  Extremities: No tenderness or deformity  Skin: No lesions or rash  Neuro: Normal motor and sensory function in all extremities  Psych: Awake, alert, normally responsive      Results:    Results for orders placed or performed in visit on 06/03/22 (from the past 24 hour(s))   UA macro with reflex to Microscopic and Culture - Clinc Collect    Specimen: Urine, Clean Catch   Result Value Ref Range    Color Urine Yellow Colorless, Straw, Light Yellow, Yellow    Appearance Urine Clear Clear    Glucose Urine Negative Negative mg/dL    Bilirubin Urine Negative Negative    Ketones Urine Negative Negative mg/dL    Specific Gravity Urine <=1.005 1.005 - 1.030    Blood Urine Moderate (A) Negative    pH Urine 5.5 5.0 - 8.0    Protein Albumin Urine Negative Negative mg/dL    Urobilinogen Urine 0.2 0.2, 1.0 E.U./dL    Nitrite Urine Negative Negative    Leukocyte Esterase Urine Small (A) Negative   Urine Microscopic   Result Value Ref Range    Bacteria Urine None Seen None Seen /HPF    RBC Urine 0-2 0-2 /HPF /HPF    WBC Urine 0-5 0-5 /HPF /HPF    Squamous Epithelials Urine Few (A) None Seen /LPF    Narrative    Urine Culture not indicated              Rich Morejon MD

## 2022-07-01 ENCOUNTER — IMMUNIZATION (OUTPATIENT)
Dept: NURSING | Facility: CLINIC | Age: 57
End: 2022-07-01
Payer: COMMERCIAL

## 2022-07-01 PROCEDURE — 91305 COVID-19,PF,PFIZER (12+ YRS): CPT

## 2022-07-01 PROCEDURE — 0054A COVID-19,PF,PFIZER (12+ YRS): CPT

## 2022-10-02 ENCOUNTER — HEALTH MAINTENANCE LETTER (OUTPATIENT)
Age: 57
End: 2022-10-02

## 2023-05-20 ENCOUNTER — HEALTH MAINTENANCE LETTER (OUTPATIENT)
Age: 58
End: 2023-05-20

## 2023-11-09 DIAGNOSIS — Z12.11 COLON CANCER SCREENING: ICD-10-CM

## 2023-11-23 ENCOUNTER — LAB (OUTPATIENT)
Dept: FAMILY MEDICINE | Facility: CLINIC | Age: 58
End: 2023-11-23
Payer: COMMERCIAL

## 2023-11-23 DIAGNOSIS — Z12.11 COLON CANCER SCREENING: ICD-10-CM

## 2024-07-27 ENCOUNTER — HEALTH MAINTENANCE LETTER (OUTPATIENT)
Age: 59
End: 2024-07-27